# Patient Record
Sex: FEMALE | URBAN - METROPOLITAN AREA
[De-identification: names, ages, dates, MRNs, and addresses within clinical notes are randomized per-mention and may not be internally consistent; named-entity substitution may affect disease eponyms.]

---

## 2018-08-17 ENCOUNTER — APPOINTMENT (RX ONLY)
Dept: URBAN - METROPOLITAN AREA CLINIC 38 | Facility: CLINIC | Age: 78
Setting detail: DERMATOLOGY
End: 2018-08-17

## 2018-08-17 DIAGNOSIS — B35.1 TINEA UNGUIUM: ICD-10-CM

## 2018-08-17 PROBLEM — C18.9 MALIGNANT NEOPLASM OF COLON, UNSPECIFIED: Status: ACTIVE | Noted: 2018-08-17

## 2018-08-17 PROBLEM — I10 ESSENTIAL (PRIMARY) HYPERTENSION: Status: ACTIVE | Noted: 2018-08-17

## 2018-08-17 PROCEDURE — 99202 OFFICE O/P NEW SF 15 MIN: CPT

## 2018-08-17 RX ORDER — CICLOPIROX 80 MG/ML
SOLUTION TOPICAL
Qty: 1 | Refills: 3 | Status: ERX | COMMUNITY
Start: 2018-08-17

## 2018-08-17 RX ADMIN — CICLOPIROX: 80 SOLUTION TOPICAL at 14:27

## 2018-08-17 NOTE — HPI: FREE FORM (INITIAL HISTORY)
How Severe Is Your Skin Condition? (The Patient Describes The Severity Level As....): moderate
What Brings You In Today? (This Is An Xx Year Old Patient Who Presents For...): Fungus
When Did You First Notice It? (The Patient First Noticed It...): Years
Where On Your Body Is It? (Located On...): Finger nails
Any Associated Symptoms? (The Symptoms Include.....): Little painful
What Happened Since That Time? (Since That Time...): Not getting bettter
What Previous Treatments Have You Tried? (The Patient Has Tried The Following Treatments...): No treatment
Did Anything Happen To Make You Want To Come In For This Specifically Today? (The Specific Reason That The Patient Came In Today Was Because:): Evaluation and treatment